# Patient Record
Sex: FEMALE
[De-identification: names, ages, dates, MRNs, and addresses within clinical notes are randomized per-mention and may not be internally consistent; named-entity substitution may affect disease eponyms.]

---

## 2023-06-13 PROBLEM — Z00.00 ENCOUNTER FOR PREVENTIVE HEALTH EXAMINATION: Status: ACTIVE | Noted: 2023-06-13

## 2023-08-14 ENCOUNTER — APPOINTMENT (OUTPATIENT)
Dept: ORTHOPEDIC SURGERY | Facility: CLINIC | Age: 48
End: 2023-08-14
Payer: COMMERCIAL

## 2023-08-14 VITALS — HEIGHT: 65 IN | WEIGHT: 122 LBS | RESPIRATION RATE: 16 BRPM | BODY MASS INDEX: 20.33 KG/M2

## 2023-08-14 DIAGNOSIS — M25.522 PAIN IN LEFT ELBOW: ICD-10-CM

## 2023-08-14 DIAGNOSIS — S69.91XA UNSPECIFIED INJURY OF RIGHT WRIST, HAND AND FINGER(S), INITIAL ENCOUNTER: ICD-10-CM

## 2023-08-14 DIAGNOSIS — Z78.9 OTHER SPECIFIED HEALTH STATUS: ICD-10-CM

## 2023-08-14 DIAGNOSIS — Z82.61 FAMILY HISTORY OF ARTHRITIS: ICD-10-CM

## 2023-08-14 DIAGNOSIS — Z87.39 PERSONAL HISTORY OF OTHER DISEASES OF THE MUSCULOSKELETAL SYSTEM AND CONNECTIVE TISSUE: ICD-10-CM

## 2023-08-14 PROCEDURE — 73130 X-RAY EXAM OF HAND: CPT | Mod: 50

## 2023-08-14 PROCEDURE — 73070 X-RAY EXAM OF ELBOW: CPT | Mod: LT

## 2023-08-14 PROCEDURE — 99204 OFFICE O/P NEW MOD 45 MIN: CPT

## 2023-08-14 NOTE — ASSESSMENT
[FreeTextEntry1] : Patient has an MRI which was available for review demonstrating full-thickness cartilage loss over the coronoid process with a displaced intra-articular chondral fragment.  Mild intramuscular sprain of the proximal pronator myotendinous junction without tear.  Mild high signal intensity proximal ulnar collateral ligament and increased signal in the fascicles of the ulnar nerve.  The ulnar nerve findings seem to be just a radiographic finding and she does not appear to have any clinical evidence of cubital tunnel but she does report some difficulty with fine motor skills but has full strength on ulnar nerve testing.  Could consider a neurology evaluation.  On exam today most consistent with medial epicondylitis.  Will give prescription for PT for this.  If no improvement could consider diagnostic as well as possible therapeutic injection.  Discussed steroid but patient has some interest in PRP  Likely had a grade 1 PIP radial collateral ligament sprain right small finger.  Patient reports having swelling in palm region more recently but not present today

## 2023-08-14 NOTE — PHYSICAL EXAM
[de-identified] : On exam she has a stable ulnar nerve in flexion.  Negative Tinel's at the elbow and carpal tunnel negative elbow flexion test.  Full strength of intrinsics as well as FDP to ring and small finger and first dorsal interosseous with a strong Nicolette maneuver.  There is slight fullness distal to the medial epicondyle.  Tender at the medial epicondyle.  No pain with wrist extension and forearm supination.  I do not see lateral swelling.  Full elbow range of motion biceps and triceps intact.  Slightly tender radial aspect PIP joint right small finger.  Full range of motion no instability [de-identified] : PA and lateral x-ray of the left elbow did not show any significant osseous abnormality.  PA lateral and oblique of both hands do not show any bony abnormality.

## 2023-08-14 NOTE — CONSULT LETTER
[Dear  ___] : Dear  [unfilled], [Consult Letter:] : I had the pleasure of evaluating your patient, [unfilled]. [Please see my note below.] : Please see my note below. [Consult Closing:] : Thank you very much for allowing me to participate in the care of this patient.  If you have any questions, please do not hesitate to contact me. [Sincerely,] : Sincerely, [FreeTextEntry3] : Cooper Cloud MD Co-Director [facility]

## 2023-08-14 NOTE — HISTORY OF PRESENT ILLNESS
[Left] : left hand dominant [FreeTextEntry1] : Patient presents with left lateral elbow pain and left medial elbow swelling sustained in 2022 while walking her dog.  Patient states her dog pulled on the leash and a few days later she noticed the swelling.  She is also complaining of right small digit pain after taking a selfie and stretching the finger outward in April 2023.  She complains of pain of the finger with ulnar deviation.